# Patient Record
Sex: FEMALE | Race: WHITE
[De-identification: names, ages, dates, MRNs, and addresses within clinical notes are randomized per-mention and may not be internally consistent; named-entity substitution may affect disease eponyms.]

---

## 2017-04-29 ENCOUNTER — TRANSCRIPTION ENCOUNTER (OUTPATIENT)
Age: 22
End: 2017-04-29

## 2017-12-19 ENCOUNTER — OUTPATIENT (OUTPATIENT)
Dept: OUTPATIENT SERVICES | Facility: HOSPITAL | Age: 22
LOS: 1 days | End: 2017-12-19
Payer: COMMERCIAL

## 2017-12-19 DIAGNOSIS — Z00.8 ENCOUNTER FOR OTHER GENERAL EXAMINATION: ICD-10-CM

## 2017-12-19 LAB
ALBUMIN SERPL ELPH-MCNC: 3.9 G/DL — SIGNIFICANT CHANGE UP (ref 3.3–5.2)
ALP SERPL-CCNC: 46 U/L — SIGNIFICANT CHANGE UP (ref 40–120)
ALT FLD-CCNC: 24 U/L — SIGNIFICANT CHANGE UP
ANION GAP SERPL CALC-SCNC: 15 MMOL/L — SIGNIFICANT CHANGE UP (ref 5–17)
APPEARANCE UR: CLEAR — SIGNIFICANT CHANGE UP
AST SERPL-CCNC: 15 U/L — SIGNIFICANT CHANGE UP
BILIRUB SERPL-MCNC: 0.3 MG/DL — LOW (ref 0.4–2)
BILIRUB UR-MCNC: NEGATIVE — SIGNIFICANT CHANGE UP
BUN SERPL-MCNC: 13 MG/DL — SIGNIFICANT CHANGE UP (ref 8–20)
CALCIUM SERPL-MCNC: 9.6 MG/DL — SIGNIFICANT CHANGE UP (ref 8.6–10.2)
CHLORIDE SERPL-SCNC: 104 MMOL/L — SIGNIFICANT CHANGE UP (ref 98–107)
CHOLEST SERPL-MCNC: 185 MG/DL — SIGNIFICANT CHANGE UP (ref 110–199)
CO2 SERPL-SCNC: 22 MMOL/L — SIGNIFICANT CHANGE UP (ref 22–29)
COLOR SPEC: YELLOW — SIGNIFICANT CHANGE UP
CREAT SERPL-MCNC: 0.68 MG/DL — SIGNIFICANT CHANGE UP (ref 0.5–1.3)
DIFF PNL FLD: ABNORMAL
EPI CELLS # UR: SIGNIFICANT CHANGE UP
FOLATE SERPL-MCNC: 15.5 NG/ML — SIGNIFICANT CHANGE UP (ref 4–16)
GLUCOSE SERPL-MCNC: 91 MG/DL — SIGNIFICANT CHANGE UP (ref 70–115)
GLUCOSE UR QL: NEGATIVE MG/DL — SIGNIFICANT CHANGE UP
HCT VFR BLD CALC: 37.4 % — SIGNIFICANT CHANGE UP (ref 37–47)
HDLC SERPL-MCNC: 62 MG/DL — LOW
HGB BLD-MCNC: 12.6 G/DL — SIGNIFICANT CHANGE UP (ref 12–16)
KETONES UR-MCNC: NEGATIVE — SIGNIFICANT CHANGE UP
LEUKOCYTE ESTERASE UR-ACNC: NEGATIVE — SIGNIFICANT CHANGE UP
LIPID PNL WITH DIRECT LDL SERPL: 105 MG/DL — SIGNIFICANT CHANGE UP
MCHC RBC-ENTMCNC: 29.1 PG — SIGNIFICANT CHANGE UP (ref 27–31)
MCHC RBC-ENTMCNC: 33.7 G/DL — SIGNIFICANT CHANGE UP (ref 32–36)
MCV RBC AUTO: 86.4 FL — SIGNIFICANT CHANGE UP (ref 81–99)
NITRITE UR-MCNC: NEGATIVE — SIGNIFICANT CHANGE UP
PH UR: 5 — SIGNIFICANT CHANGE UP (ref 5–8)
PLATELET # BLD AUTO: 312 K/UL — SIGNIFICANT CHANGE UP (ref 150–400)
POTASSIUM SERPL-MCNC: 4.2 MMOL/L — SIGNIFICANT CHANGE UP (ref 3.5–5.3)
POTASSIUM SERPL-SCNC: 4.2 MMOL/L — SIGNIFICANT CHANGE UP (ref 3.5–5.3)
PROT SERPL-MCNC: 7.6 G/DL — SIGNIFICANT CHANGE UP (ref 6.6–8.7)
PROT UR-MCNC: NEGATIVE MG/DL — SIGNIFICANT CHANGE UP
RBC # BLD: 4.33 M/UL — LOW (ref 4.4–5.2)
RBC # FLD: 13.1 % — SIGNIFICANT CHANGE UP (ref 11–15.6)
SODIUM SERPL-SCNC: 141 MMOL/L — SIGNIFICANT CHANGE UP (ref 135–145)
SP GR SPEC: 1.02 — SIGNIFICANT CHANGE UP (ref 1.01–1.02)
T3FREE SERPL-MCNC: 3.27 PG/ML — SIGNIFICANT CHANGE UP (ref 1.8–4.6)
T4 FREE SERPL-MCNC: 1 NG/DL — SIGNIFICANT CHANGE UP (ref 0.9–1.8)
TOTAL CHOLESTEROL/HDL RATIO MEASUREMENT: 3 RATIO — LOW (ref 3.3–7.1)
TRIGL SERPL-MCNC: 88 MG/DL — SIGNIFICANT CHANGE UP (ref 10–200)
TSH SERPL-MCNC: 1.5 UIU/ML — SIGNIFICANT CHANGE UP (ref 0.27–4.2)
UROBILINOGEN FLD QL: NEGATIVE MG/DL — SIGNIFICANT CHANGE UP
VIT B12 SERPL-MCNC: 312 PG/ML — SIGNIFICANT CHANGE UP (ref 180–914)
WBC # BLD: 8.8 K/UL — SIGNIFICANT CHANGE UP (ref 4.8–10.8)
WBC # FLD AUTO: 8.8 K/UL — SIGNIFICANT CHANGE UP (ref 4.8–10.8)
WBC UR QL: SIGNIFICANT CHANGE UP

## 2017-12-19 PROCEDURE — 82746 ASSAY OF FOLIC ACID SERUM: CPT

## 2017-12-19 PROCEDURE — 80061 LIPID PANEL: CPT

## 2017-12-19 PROCEDURE — 84439 ASSAY OF FREE THYROXINE: CPT

## 2017-12-19 PROCEDURE — 85027 COMPLETE CBC AUTOMATED: CPT

## 2017-12-19 PROCEDURE — 84481 FREE ASSAY (FT-3): CPT

## 2017-12-19 PROCEDURE — 82306 VITAMIN D 25 HYDROXY: CPT

## 2017-12-19 PROCEDURE — 84443 ASSAY THYROID STIM HORMONE: CPT

## 2017-12-19 PROCEDURE — 81001 URINALYSIS AUTO W/SCOPE: CPT

## 2017-12-19 PROCEDURE — 80053 COMPREHEN METABOLIC PANEL: CPT

## 2017-12-19 PROCEDURE — 36415 COLL VENOUS BLD VENIPUNCTURE: CPT

## 2017-12-19 PROCEDURE — 82607 VITAMIN B-12: CPT

## 2017-12-20 LAB — 24R-OH-CALCIDIOL SERPL-MCNC: 28.5 NG/ML — LOW (ref 30–80)

## 2018-07-20 ENCOUNTER — TRANSCRIPTION ENCOUNTER (OUTPATIENT)
Age: 23
End: 2018-07-20

## 2018-07-23 ENCOUNTER — APPOINTMENT (OUTPATIENT)
Dept: OBGYN | Facility: CLINIC | Age: 23
End: 2018-07-23
Payer: COMMERCIAL

## 2018-07-23 ENCOUNTER — LABORATORY RESULT (OUTPATIENT)
Age: 23
End: 2018-07-23

## 2018-07-23 VITALS
WEIGHT: 210 LBS | HEIGHT: 58 IN | DIASTOLIC BLOOD PRESSURE: 80 MMHG | SYSTOLIC BLOOD PRESSURE: 120 MMHG | BODY MASS INDEX: 44.08 KG/M2

## 2018-07-23 PROCEDURE — 99213 OFFICE O/P EST LOW 20 MIN: CPT

## 2018-07-23 PROCEDURE — 81003 URINALYSIS AUTO W/O SCOPE: CPT | Mod: QW

## 2018-08-09 LAB
BILIRUB UR QL STRIP: NORMAL
GLUCOSE UR-MCNC: NORMAL
HCG UR QL: 0.2 EU/DL
HGB UR QL STRIP.AUTO: NORMAL
HSV+VZV DNA SPEC QL NAA+PROBE: ABNORMAL
KETONES UR-MCNC: NORMAL
LEUKOCYTE ESTERASE UR QL STRIP: NORMAL
NITRITE UR QL STRIP: POSITIVE
PH UR STRIP: 6
PROT UR STRIP-MCNC: NORMAL
SP GR UR STRIP: 1.01
SPECIMEN SOURCE: NORMAL

## 2018-08-17 ENCOUNTER — APPOINTMENT (OUTPATIENT)
Dept: OTOLARYNGOLOGY | Facility: CLINIC | Age: 23
End: 2018-08-17
Payer: COMMERCIAL

## 2018-08-17 VITALS
HEIGHT: 58 IN | BODY MASS INDEX: 39.88 KG/M2 | WEIGHT: 190 LBS | DIASTOLIC BLOOD PRESSURE: 85 MMHG | SYSTOLIC BLOOD PRESSURE: 125 MMHG | HEART RATE: 73 BPM

## 2018-08-17 PROCEDURE — 99203 OFFICE O/P NEW LOW 30 MIN: CPT

## 2019-01-07 ENCOUNTER — RX RENEWAL (OUTPATIENT)
Age: 24
End: 2019-01-07

## 2019-02-01 ENCOUNTER — APPOINTMENT (OUTPATIENT)
Dept: OBGYN | Facility: CLINIC | Age: 24
End: 2019-02-01

## 2019-02-04 ENCOUNTER — RX RENEWAL (OUTPATIENT)
Age: 24
End: 2019-02-04

## 2019-04-05 ENCOUNTER — APPOINTMENT (OUTPATIENT)
Dept: OBGYN | Facility: CLINIC | Age: 24
End: 2019-04-05
Payer: COMMERCIAL

## 2019-04-05 VITALS
DIASTOLIC BLOOD PRESSURE: 70 MMHG | WEIGHT: 210 LBS | SYSTOLIC BLOOD PRESSURE: 120 MMHG | HEIGHT: 58 IN | BODY MASS INDEX: 44.08 KG/M2

## 2019-04-05 DIAGNOSIS — Z78.9 OTHER SPECIFIED HEALTH STATUS: ICD-10-CM

## 2019-04-05 PROCEDURE — 99395 PREV VISIT EST AGE 18-39: CPT

## 2019-04-08 LAB
C TRACH RRNA SPEC QL NAA+PROBE: NOT DETECTED
N GONORRHOEA RRNA SPEC QL NAA+PROBE: NOT DETECTED
SOURCE TP AMPLIFICATION: NORMAL

## 2019-04-10 LAB — CYTOLOGY CVX/VAG DOC THIN PREP: NORMAL

## 2020-04-17 ENCOUNTER — APPOINTMENT (OUTPATIENT)
Dept: OBGYN | Facility: CLINIC | Age: 25
End: 2020-04-17

## 2020-06-23 DIAGNOSIS — Z11.3 ENCOUNTER FOR SCREENING FOR INFECTIONS WITH A PREDOMINANTLY SEXUAL MODE OF TRANSMISSION: ICD-10-CM

## 2020-06-24 ENCOUNTER — APPOINTMENT (OUTPATIENT)
Dept: OBGYN | Facility: CLINIC | Age: 25
End: 2020-06-24
Payer: COMMERCIAL

## 2020-06-24 VITALS
HEIGHT: 58 IN | SYSTOLIC BLOOD PRESSURE: 123 MMHG | DIASTOLIC BLOOD PRESSURE: 77 MMHG | HEART RATE: 98 BPM | WEIGHT: 210 LBS | BODY MASS INDEX: 44.08 KG/M2

## 2020-06-24 DIAGNOSIS — Z01.419 ENCOUNTER FOR GYNECOLOGICAL EXAMINATION (GENERAL) (ROUTINE) W/OUT ABNORMAL FINDINGS: ICD-10-CM

## 2020-06-24 PROCEDURE — 99395 PREV VISIT EST AGE 18-39: CPT

## 2020-06-24 NOTE — PHYSICAL EXAM
[Acute Distress] : no acute distress [Alert] : alert [Awake] : awake [Mass] : no breast mass [Nipple Discharge] : no nipple discharge [Tender] : non tender [Axillary LAD] : no axillary lymphadenopathy [Soft] : soft [Oriented x3] : oriented to person, place, and time [Normal] : uterus [Uterine Adnexae] : were not tender and not enlarged [No Bleeding] : there was no active vaginal bleeding [CTAB] : CTAB [RRR, No Murmurs] : RRR, no murmurs

## 2020-07-01 LAB — CYTOLOGY CVX/VAG DOC THIN PREP: NORMAL

## 2020-12-23 PROBLEM — Z01.419 ENCOUNTER FOR GYNECOLOGICAL EXAMINATION WITH PAPANICOLAOU SMEAR OF CERVIX: Status: RESOLVED | Noted: 2020-06-24 | Resolved: 2020-12-23

## 2021-02-28 ENCOUNTER — TRANSCRIPTION ENCOUNTER (OUTPATIENT)
Age: 26
End: 2021-02-28

## 2021-06-23 ENCOUNTER — NON-APPOINTMENT (OUTPATIENT)
Age: 26
End: 2021-06-23

## 2021-06-23 ENCOUNTER — APPOINTMENT (OUTPATIENT)
Dept: OBGYN | Facility: CLINIC | Age: 26
End: 2021-06-23
Payer: COMMERCIAL

## 2021-06-23 VITALS
BODY MASS INDEX: 44.08 KG/M2 | HEIGHT: 58 IN | DIASTOLIC BLOOD PRESSURE: 70 MMHG | WEIGHT: 210 LBS | SYSTOLIC BLOOD PRESSURE: 120 MMHG

## 2021-06-23 PROCEDURE — 99395 PREV VISIT EST AGE 18-39: CPT

## 2021-06-23 PROCEDURE — 99072 ADDL SUPL MATRL&STAF TM PHE: CPT

## 2021-06-23 NOTE — PLAN
[FreeTextEntry1] : 26 year old female wwe\par \par 1. Pap done\par 2. SBE reviewed\par 3. Rx levora (+/- d/w patient)\par 4. Annual exam in 1 year

## 2021-06-23 NOTE — HISTORY OF PRESENT ILLNESS
[FreeTextEntry1] : 26 year old female presents for wwe.  She has no complaints.  She is happy on levora and would like to continue the pill.  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

## 2021-07-05 LAB
C TRACH RRNA SPEC QL NAA+PROBE: NOT DETECTED
CYTOLOGY CVX/VAG DOC THIN PREP: NORMAL
N GONORRHOEA RRNA SPEC QL NAA+PROBE: NOT DETECTED
SOURCE TP AMPLIFICATION: NORMAL

## 2021-07-24 ENCOUNTER — TRANSCRIPTION ENCOUNTER (OUTPATIENT)
Age: 26
End: 2021-07-24

## 2022-02-22 ENCOUNTER — TRANSCRIPTION ENCOUNTER (OUTPATIENT)
Age: 27
End: 2022-02-22

## 2022-05-01 ENCOUNTER — NON-APPOINTMENT (OUTPATIENT)
Age: 27
End: 2022-05-01

## 2022-06-28 ENCOUNTER — APPOINTMENT (OUTPATIENT)
Dept: OBGYN | Facility: CLINIC | Age: 27
End: 2022-06-28

## 2022-08-17 ENCOUNTER — APPOINTMENT (OUTPATIENT)
Dept: OBGYN | Facility: CLINIC | Age: 27
End: 2022-08-17

## 2022-08-17 ENCOUNTER — NON-APPOINTMENT (OUTPATIENT)
Age: 27
End: 2022-08-17

## 2022-08-17 VITALS
HEIGHT: 58 IN | WEIGHT: 214 LBS | SYSTOLIC BLOOD PRESSURE: 120 MMHG | BODY MASS INDEX: 44.92 KG/M2 | DIASTOLIC BLOOD PRESSURE: 80 MMHG

## 2022-08-17 PROCEDURE — 99395 PREV VISIT EST AGE 18-39: CPT

## 2022-08-17 RX ORDER — LEVONORGESTREL AND ETHINYL ESTRADIOL 0.15-0.03
0.15-3 KIT ORAL DAILY
Qty: 3 | Refills: 0 | Status: COMPLETED | COMMUNITY
Start: 2020-03-18 | End: 2022-08-17

## 2022-08-17 RX ORDER — LEVONORGESTREL AND ETHINYL ESTRADIOL 0.15-0.03
0.15-3 KIT ORAL DAILY
Qty: 1 | Refills: 0 | Status: COMPLETED | COMMUNITY
Start: 2020-05-29 | End: 2022-08-17

## 2022-08-17 RX ORDER — LEVONORGESTREL AND ETHINYL ESTRADIOL 0.15-0.03
0.15-3 KIT ORAL DAILY
Qty: 3 | Refills: 3 | Status: COMPLETED | COMMUNITY
Start: 2019-04-05 | End: 2022-08-17

## 2022-08-17 RX ORDER — LEVONORGESTREL AND ETHINYL ESTRADIOL 0.15-0.03
0.15-3 KIT ORAL DAILY
Qty: 3 | Refills: 3 | Status: COMPLETED | COMMUNITY
Start: 2021-06-23 | End: 2022-08-17

## 2022-08-17 RX ORDER — LEVONORGESTREL AND ETHINYL ESTRADIOL 0.15-0.03
0.15-3 KIT ORAL DAILY
Qty: 3 | Refills: 3 | Status: COMPLETED | COMMUNITY
Start: 2019-01-07 | End: 2022-08-17

## 2022-08-17 RX ORDER — LEVONORGESTREL AND ETHINYL ESTRADIOL 0.15-0.03
0.15-3 KIT ORAL DAILY
Qty: 3 | Refills: 0 | Status: COMPLETED | COMMUNITY
Start: 2019-02-04 | End: 2022-08-17

## 2022-08-17 NOTE — HISTORY OF PRESENT ILLNESS
[FreeTextEntry1] : 27-year-old female presents for well woman exam.  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  She has an appointment scheduled for a consult with neurology.  She denies aura with her migraines.  She is taking Levora for contraception.  She is happy on the pill and would like to continue.  She has a history of HSV-2.  She has not had an outbreak in many years.  She would like a prescription for Valtrex in the event that she does get an outbreak.  She has no significant medical history.  She has no significant family history.

## 2022-08-17 NOTE — PLAN
[FreeTextEntry1] : 27-year-old female well woman exam\par \par 1.  Pap done\par 2.  Self breast exam reviewed\par 3.  Rx Levora x1 year.  All risks, benefits and potential complications of combined OCPs reviewed with the patient.  We discussed that she should let her neurologist know that she is taking combined OCPs.  If her neurologist finds any contraindication to her being on combined OCPs she will call the office.\par 4.  History of HSV-2.  Rx given for Valtrex 500 mg twice daily x3 days in the event she has an outbreak.\par 5.  Annual exam in 1 year

## 2022-08-26 LAB — CYTOLOGY CVX/VAG DOC THIN PREP: NORMAL

## 2022-09-14 ENCOUNTER — APPOINTMENT (OUTPATIENT)
Dept: ANTEPARTUM | Facility: CLINIC | Age: 27
End: 2022-09-14

## 2022-10-23 ENCOUNTER — NON-APPOINTMENT (OUTPATIENT)
Age: 27
End: 2022-10-23

## 2023-08-08 ENCOUNTER — NON-APPOINTMENT (OUTPATIENT)
Age: 28
End: 2023-08-08

## 2023-10-13 ENCOUNTER — APPOINTMENT (OUTPATIENT)
Dept: OBGYN | Facility: CLINIC | Age: 28
End: 2023-10-13
Payer: COMMERCIAL

## 2023-10-13 ENCOUNTER — NON-APPOINTMENT (OUTPATIENT)
Age: 28
End: 2023-10-13

## 2023-10-13 VITALS
DIASTOLIC BLOOD PRESSURE: 90 MMHG | BODY MASS INDEX: 49.33 KG/M2 | SYSTOLIC BLOOD PRESSURE: 130 MMHG | HEIGHT: 58 IN | WEIGHT: 235 LBS

## 2023-10-13 DIAGNOSIS — Z01.419 ENCOUNTER FOR GYNECOLOGICAL EXAMINATION (GENERAL) (ROUTINE) W/OUT ABNORMAL FINDINGS: ICD-10-CM

## 2023-10-13 DIAGNOSIS — J35.1 HYPERTROPHY OF TONSILS: ICD-10-CM

## 2023-10-13 DIAGNOSIS — J35.01 CHRONIC TONSILLITIS: ICD-10-CM

## 2023-10-13 DIAGNOSIS — Z86.69 PERSONAL HISTORY OF OTHER DISEASES OF THE NERVOUS SYSTEM AND SENSE ORGANS: ICD-10-CM

## 2023-10-13 DIAGNOSIS — Z30.09 ENCOUNTER FOR OTHER GENERAL COUNSELING AND ADVICE ON CONTRACEPTION: ICD-10-CM

## 2023-10-13 PROCEDURE — 99395 PREV VISIT EST AGE 18-39: CPT

## 2023-10-13 PROCEDURE — 99213 OFFICE O/P EST LOW 20 MIN: CPT | Mod: 25

## 2023-10-13 RX ORDER — VALACYCLOVIR 500 MG/1
500 TABLET, FILM COATED ORAL TWICE DAILY
Qty: 6 | Refills: 0 | Status: ACTIVE | COMMUNITY
Start: 2018-07-23 | End: 1900-01-01

## 2023-10-13 RX ORDER — LEVONORGESTREL AND ETHINYL ESTRADIOL 0.15-0.03
0.15-3 KIT ORAL DAILY
Qty: 2 | Refills: 0 | Status: COMPLETED | COMMUNITY
Start: 2021-10-28 | End: 2023-10-13

## 2023-10-13 RX ORDER — LIDOCAINE 5 G/100G
5 OINTMENT TOPICAL
Qty: 1 | Refills: 0 | Status: COMPLETED | COMMUNITY
Start: 2018-07-23 | End: 2023-10-13

## 2023-10-30 LAB — CYTOLOGY CVX/VAG DOC THIN PREP: ABNORMAL

## 2023-12-18 ENCOUNTER — OFFICE (OUTPATIENT)
Dept: URBAN - METROPOLITAN AREA CLINIC 116 | Facility: CLINIC | Age: 28
Setting detail: OPHTHALMOLOGY
End: 2023-12-18
Payer: COMMERCIAL

## 2023-12-18 DIAGNOSIS — H10.433: ICD-10-CM

## 2023-12-18 DIAGNOSIS — H52.203: ICD-10-CM

## 2023-12-18 DIAGNOSIS — R51.9: ICD-10-CM

## 2023-12-18 PROCEDURE — 92004 COMPRE OPH EXAM NEW PT 1/>: CPT | Performed by: OPTOMETRIST

## 2023-12-18 PROCEDURE — 92250 FUNDUS PHOTOGRAPHY W/I&R: CPT | Performed by: OPTOMETRIST

## 2023-12-18 ASSESSMENT — REFRACTION_MANIFEST
OD_AXIS: 165
OD_VA1: 20/25
OS_AXIS: 015
OS_SPHERE: PLANO
OD_SPHERE: PLANO
OS_CYLINDER: -1.00
OD_CYLINDER: -2.00
OS_VA1: 20/25

## 2023-12-18 ASSESSMENT — REFRACTION_CURRENTRX
OD_AXIS: 171
OS_OVR_VA: 20/
OD_OVR_VA: 20/
OD_SPHERE: +0.50
OS_AXIS: 001
OS_SPHERE: -0.50
OS_CYLINDER: -1.00
OD_CYLINDER: -2.25

## 2023-12-18 ASSESSMENT — SPHEQUIV_DERIVED
OS_SPHEQUIV: -0.75
OD_SPHEQUIV: -0.375

## 2023-12-18 ASSESSMENT — REFRACTION_AUTOREFRACTION
OD_AXIS: 165
OD_CYLINDER: -2.25
OD_SPHERE: +0.75
OS_SPHERE: -0.25
OS_AXIS: 015
OS_CYLINDER: -1.00

## 2023-12-18 ASSESSMENT — CONFRONTATIONAL VISUAL FIELD TEST (CVF)
OS_FINDINGS: FULL
OD_FINDINGS: FULL

## 2024-01-05 ENCOUNTER — APPOINTMENT (OUTPATIENT)
Dept: OBGYN | Facility: CLINIC | Age: 29
End: 2024-01-05

## 2024-01-31 RX ORDER — NORETHINDRONE 0.35 MG/1
0.35 TABLET ORAL DAILY
Qty: 3 | Refills: 0 | Status: ACTIVE | COMMUNITY
Start: 2023-10-13 | End: 1900-01-01

## 2024-04-12 ENCOUNTER — APPOINTMENT (OUTPATIENT)
Dept: OBGYN | Facility: CLINIC | Age: 29
End: 2024-04-12

## 2024-10-04 ENCOUNTER — OUTPATIENT (OUTPATIENT)
Dept: OUTPATIENT SERVICES | Facility: HOSPITAL | Age: 29
LOS: 1 days | End: 2024-10-04
Payer: COMMERCIAL

## 2024-10-04 DIAGNOSIS — N92.6 IRREGULAR MENSTRUATION, UNSPECIFIED: ICD-10-CM

## 2024-10-04 PROCEDURE — 82306 VITAMIN D 25 HYDROXY: CPT

## 2024-10-04 PROCEDURE — 83540 ASSAY OF IRON: CPT

## 2024-10-04 PROCEDURE — 82728 ASSAY OF FERRITIN: CPT

## 2024-10-04 PROCEDURE — 82166 ASSAY ANTI-MULLERIAN HORM: CPT

## 2024-10-04 PROCEDURE — 83036 HEMOGLOBIN GLYCOSYLATED A1C: CPT

## 2024-10-04 PROCEDURE — 80048 BASIC METABOLIC PNL TOTAL CA: CPT

## 2024-10-04 PROCEDURE — 84466 ASSAY OF TRANSFERRIN: CPT

## 2024-10-04 PROCEDURE — 84436 ASSAY OF TOTAL THYROXINE: CPT

## 2024-10-04 PROCEDURE — 84702 CHORIONIC GONADOTROPIN TEST: CPT

## 2024-10-04 PROCEDURE — 82746 ASSAY OF FOLIC ACID SERUM: CPT

## 2024-10-04 PROCEDURE — 85025 COMPLETE CBC W/AUTO DIFF WBC: CPT

## 2024-10-04 PROCEDURE — 84443 ASSAY THYROID STIM HORMONE: CPT

## 2024-10-04 PROCEDURE — 36415 COLL VENOUS BLD VENIPUNCTURE: CPT

## 2024-10-04 PROCEDURE — 83002 ASSAY OF GONADOTROPIN (LH): CPT

## 2024-10-04 PROCEDURE — 84144 ASSAY OF PROGESTERONE: CPT

## 2024-10-04 PROCEDURE — 83550 IRON BINDING TEST: CPT

## 2024-10-04 PROCEDURE — 82607 VITAMIN B-12: CPT

## 2024-10-04 PROCEDURE — 84480 ASSAY TRIIODOTHYRONINE (T3): CPT

## 2024-10-04 PROCEDURE — 83001 ASSAY OF GONADOTROPIN (FSH): CPT

## 2024-10-04 PROCEDURE — 82670 ASSAY OF TOTAL ESTRADIOL: CPT
